# Patient Record
Sex: MALE | URBAN - METROPOLITAN AREA
[De-identification: names, ages, dates, MRNs, and addresses within clinical notes are randomized per-mention and may not be internally consistent; named-entity substitution may affect disease eponyms.]

---

## 2017-06-15 ENCOUNTER — APPOINTMENT (RX ONLY)
Dept: URBAN - METROPOLITAN AREA CLINIC 15 | Facility: CLINIC | Age: 47
Setting detail: DERMATOLOGY
End: 2017-06-15

## 2017-06-15 DIAGNOSIS — T07XXXA ABRASION OR FRICTION BURN OF OTHER, MULTIPLE, AND UNSPECIFIED SITES, WITHOUT MENTION OF INFECTION: ICD-10-CM

## 2017-06-15 DIAGNOSIS — L85.3 XEROSIS CUTIS: ICD-10-CM

## 2017-06-15 DIAGNOSIS — L20.89 OTHER ATOPIC DERMATITIS: ICD-10-CM

## 2017-06-15 DIAGNOSIS — D22 MELANOCYTIC NEVI: ICD-10-CM

## 2017-06-15 PROBLEM — S80.811A ABRASION, RIGHT LOWER LEG, INITIAL ENCOUNTER: Status: ACTIVE | Noted: 2017-06-15

## 2017-06-15 PROBLEM — L20.84 INTRINSIC (ALLERGIC) ECZEMA: Status: ACTIVE | Noted: 2017-06-15

## 2017-06-15 PROBLEM — B20 HUMAN IMMUNODEFICIENCY VIRUS [HIV] DISEASE: Status: ACTIVE | Noted: 2017-06-15

## 2017-06-15 PROBLEM — D22.0 MELANOCYTIC NEVI OF LIP: Status: ACTIVE | Noted: 2017-06-15

## 2017-06-15 PROBLEM — J45.909 UNSPECIFIED ASTHMA, UNCOMPLICATED: Status: ACTIVE | Noted: 2017-06-15

## 2017-06-15 PROCEDURE — ? COUNSELING

## 2017-06-15 PROCEDURE — ? TREATMENT REGIMEN

## 2017-06-15 PROCEDURE — 99203 OFFICE O/P NEW LOW 30 MIN: CPT

## 2017-06-15 PROCEDURE — ? PRESCRIPTION

## 2017-06-15 PROCEDURE — ? ADDITIONAL NOTES

## 2017-06-15 RX ORDER — TRIAMCINOLONE ACETONIDE 1 MG/G
CREAM TOPICAL
Qty: 1 | Refills: 0 | COMMUNITY
Start: 2017-06-15

## 2017-06-15 RX ORDER — MUPIROCIN 20 MG/G
OINTMENT TOPICAL
Qty: 1 | Refills: 2 | COMMUNITY
Start: 2017-06-15

## 2017-06-15 RX ADMIN — TRIAMCINOLONE ACETONIDE: 1 CREAM TOPICAL at 16:13

## 2017-06-15 RX ADMIN — MUPIROCIN: 20 OINTMENT TOPICAL at 16:24

## 2017-06-15 ASSESSMENT — LOCATION ZONE DERM
LOCATION ZONE: LIP
LOCATION ZONE: LEG

## 2017-06-15 ASSESSMENT — LOCATION DETAILED DESCRIPTION DERM
LOCATION DETAILED: LEFT UPPER CUTANEOUS LIP
LOCATION DETAILED: RIGHT DISTAL CALF

## 2017-06-15 ASSESSMENT — LOCATION SIMPLE DESCRIPTION DERM
LOCATION SIMPLE: LEFT LIP
LOCATION SIMPLE: RIGHT CALF

## 2017-06-15 NOTE — PROCEDURE: TREATMENT REGIMEN
Otc Regimen: Aquaphor healing ointment twice daily
Detail Level: Detailed
Initiate Treatment: Triamcinolone 0.1% Cream mixed with Aquaphor healing ointment BID x 2 weeks then QD x 2 weeks
Initiate Treatment: Mupirocin 2% Ointment twice daily
Detail Level: Zone
Otc Regimen: CeraVe Cream once daily after showers

## 2017-06-15 NOTE — PROCEDURE: ADDITIONAL NOTES
Additional Notes: Secondary to scratching. Will have patient apply Mupirocin 2% ointment twice daily to prevent infection.